# Patient Record
Sex: MALE | Race: WHITE | NOT HISPANIC OR LATINO | Employment: FULL TIME | URBAN - METROPOLITAN AREA
[De-identification: names, ages, dates, MRNs, and addresses within clinical notes are randomized per-mention and may not be internally consistent; named-entity substitution may affect disease eponyms.]

---

## 2017-04-18 ENCOUNTER — OFFICE VISIT - HEALTHEAST (OUTPATIENT)
Dept: FAMILY MEDICINE | Facility: CLINIC | Age: 22
End: 2017-04-18

## 2017-04-18 DIAGNOSIS — A53.9 SYPHILIS: ICD-10-CM

## 2017-04-18 RX ORDER — IBUPROFEN 600 MG/1
TABLET, FILM COATED ORAL
Refills: 0 | Status: SHIPPED | COMMUNITY
Start: 2017-04-01

## 2017-04-19 LAB — SYPHILIS RPR SCREEN - HISTORICAL: REACTIVE

## 2017-04-21 ENCOUNTER — AMBULATORY - HEALTHEAST (OUTPATIENT)
Dept: NURSING | Facility: CLINIC | Age: 22
End: 2017-04-21

## 2017-04-21 LAB — SPECIMEN STATUS: NORMAL

## 2017-04-24 ENCOUNTER — COMMUNICATION - HEALTHEAST (OUTPATIENT)
Dept: FAMILY MEDICINE | Facility: CLINIC | Age: 22
End: 2017-04-24

## 2017-05-05 ENCOUNTER — AMBULATORY - HEALTHEAST (OUTPATIENT)
Dept: NURSING | Facility: CLINIC | Age: 22
End: 2017-05-05

## 2017-05-12 ENCOUNTER — COMMUNICATION - HEALTHEAST (OUTPATIENT)
Dept: FAMILY MEDICINE | Facility: CLINIC | Age: 22
End: 2017-05-12

## 2017-05-12 ENCOUNTER — AMBULATORY - HEALTHEAST (OUTPATIENT)
Dept: NURSING | Facility: CLINIC | Age: 22
End: 2017-05-12

## 2018-02-21 ENCOUNTER — OFFICE VISIT - HEALTHEAST (OUTPATIENT)
Dept: FAMILY MEDICINE | Facility: CLINIC | Age: 23
End: 2018-02-21

## 2018-02-21 DIAGNOSIS — J32.9 SINUSITIS: ICD-10-CM

## 2018-02-21 DIAGNOSIS — R21 RASH AND NONSPECIFIC SKIN ERUPTION: ICD-10-CM

## 2018-02-21 RX ORDER — OXYMETAZOLINE HYDROCHLORIDE 0.05 G/100ML
2 SPRAY NASAL 2 TIMES DAILY
Qty: 15 ML | Refills: 0 | Status: SHIPPED | OUTPATIENT
Start: 2018-02-21

## 2018-02-21 RX ORDER — IPRATROPIUM BROMIDE 21 UG/1
2 SPRAY, METERED NASAL 2 TIMES DAILY
Qty: 30 ML | Refills: 2 | Status: SHIPPED | OUTPATIENT
Start: 2018-02-21

## 2018-02-22 LAB — SYPHILIS RPR SCREEN - HISTORICAL: REACTIVE

## 2018-02-26 LAB — SPECIMEN STATUS: NORMAL

## 2018-02-27 ENCOUNTER — COMMUNICATION - HEALTHEAST (OUTPATIENT)
Dept: FAMILY MEDICINE | Facility: CLINIC | Age: 23
End: 2018-02-27

## 2018-03-02 ENCOUNTER — OFFICE VISIT - HEALTHEAST (OUTPATIENT)
Dept: FAMILY MEDICINE | Facility: CLINIC | Age: 23
End: 2018-03-02

## 2018-03-02 DIAGNOSIS — R21 RASH AND NONSPECIFIC SKIN ERUPTION: ICD-10-CM

## 2018-03-02 DIAGNOSIS — A53.9 SYPHILIS: ICD-10-CM

## 2018-03-23 ENCOUNTER — COMMUNICATION - HEALTHEAST (OUTPATIENT)
Dept: ADMINISTRATIVE | Facility: CLINIC | Age: 23
End: 2018-03-23

## 2018-04-09 ENCOUNTER — RECORDS - HEALTHEAST (OUTPATIENT)
Dept: ADMINISTRATIVE | Facility: OTHER | Age: 23
End: 2018-04-09

## 2021-05-30 VITALS — BODY MASS INDEX: 26.06 KG/M2 | WEIGHT: 166.4 LBS

## 2021-06-01 VITALS — BODY MASS INDEX: 27.82 KG/M2 | WEIGHT: 177.6 LBS

## 2021-06-01 VITALS — WEIGHT: 181.8 LBS | BODY MASS INDEX: 28.47 KG/M2

## 2021-06-10 NOTE — PROGRESS NOTES
ASSESSMENT:  1. Syphilis  - RPR  - penicillin G benzathine injection 2.4 Million Units (BICILLIN-LA); Inject 4 mL (2.4 Million Units total) into the shoulder, thigh, or buttocks once a week.    PLAN:  There are no Patient Instructions on file for this visit.    Orders Placed This Encounter   Procedures     RPR     Medications Discontinued During This Encounter   Medication Reason     penicillin G benzathine injection 2.4 Million Units (BICILLIN-LA)        No Follow-up on file.     Ordered penicillin injections for syphilis. Patient will have the first injection on Friday, April 21st. Will get lab as well to confirm.Coinseled to make sure to have a full treatment this time around to try to eradicate this.     CHIEF COMPLAINT:  Chief Complaint   Patient presents with     red spots     red spots on genitals and on chest x 1 week       HISTORY OF PRESENT ILLNESS:  Gal is a 21 y.o. male presenting to the clinic today for evaluation of rash.He has a history of Syphilis in the past not fully treated.He has had red spots on his genital area and chest. He first noticed the red spots in his genital area yesterday. He noticed the red spots on his chest before the spots on his genital area, but the spots on his chest were not concerning to him initially because he thought that it was acne. The red spots are not painful or itchy. He does not have any rashes on his palms. He does not have any rashes on his mouth currently. Of note, he has had red spots on his mouth, hands, and feet in the past from syphilis, but he believes the red spots he has now are different. When he had syphilis, he was treated with 2 penicillin injections, but he never had the third injection because the appointment was never set up. He is not sexually active.     REVIEW OF SYSTEMS:   He notes that he is tired and has had lack of sleep because he has strange work hours. All other systems are negative.     PFSH:  Social: He will be on a camping trip from  April 23-29.     Reviewed, as below.     TOBACCO USE:  History   Smoking Status     Never Smoker   Smokeless Tobacco     Never Used       VITALS:  Vitals:    04/18/17 0824   BP: 112/68   Pulse: 68   Weight: 166 lb 6.4 oz (75.5 kg)     Wt Readings from Last 3 Encounters:   04/18/17 166 lb 6.4 oz (75.5 kg)   04/19/16 168 lb 3.2 oz (76.3 kg) (68 %, Z= 0.48)*     * Growth percentiles are based on CDC 2-20 Years data.     Body mass index is 26.06 kg/(m^2).    PHYSICAL EXAM:  General Appearance: Alert, cooperative, no distress, appears stated age  HEENT: Pupils equal, symmetric  Lungs: Respirations unlabored  Skin: Red blanchable rash, multiple noted on the chest and on the phallus.   Neurologic:  Alert and oriented times 3. Normal reflexes. Cranial nerves II-XII intact.   Psychiatric: Normal mood and affect.      ADDITIONAL HISTORY SUMMARIZED (2): Reviewed 4/19/2016 note from Dr. Smith regarding mouth lesion.   DECISION TO OBTAIN EXTRA INFORMATION (1): None.   RADIOLOGY TESTS (1): None.  LABS (1): Ordered lab. Reviewed 4/19/2016 syphilis screen.   MEDICINE TESTS (1): None.  INDEPENDENT REVIEW (2 each): None.       The visit lasted a total of 21 minutes face to face with the patient. Over 50% of the time was spent counseling and educating the patient about rash and syphilis.     IDonavon, am scribing for and in the presence of, Dr. Hamm.    I, Dr. Hamm, personally performed the services described in this documentation, as scribed by Donavon Agarwal in my presence, and it is both accurate and complete.    MEDICATIONS:  Current Outpatient Prescriptions   Medication Sig Dispense Refill     ibuprofen (ADVIL,MOTRIN) 600 MG tablet TAKE 1 TABLET BY MOUTH EVERY 6 HOURS FOR 3 DAYS, THEN EVERY 6 HOURS AS NEEDED FOR PAIN.  0     multivitamin therapeutic (THERAGRAN) tablet Take 1 tablet by mouth daily.       Current Facility-Administered Medications   Medication Dose Route Frequency Provider Last Rate Last Dose     [START  ON 4/21/2017] penicillin G benzathine injection 2.4 Million Units (BICILLIN-LA)  2.4 Million Units Intramuscular Weekly Hawk Hamm MD           Total data points: 3

## 2021-06-16 NOTE — PROGRESS NOTES
ASSESSMENT:  1. Rash and nonspecific skin eruption  - Ambulatory referral to Dermatology  - doxycycline (VIBRA-TABS) 100 MG tablet; Take 1 tablet (100 mg total) by mouth 2 (two) times a day for 14 days.  Dispense: 28 tablet; Refill: 0    2. Syphilis  - Syphilis Screen, [place]; Future      PLAN:  He feels that the rash is very mildly improved.  But I went through with him the previous lab results for the TPPA titer.  Noted that he April 2016 the titer was 1 in 32, in April 22, 2017 and the data has reduced to 1 in 4 and he was treated also.  And currently on 23 February 2018 he had a titre of 1 in 2.  Based on that I do not think that it is necessary to have been treated but because of his concern about it I did give him another prescription for doxycycline for 21 days.  And he will come back in for a recheck.  I also give him a referral to see the dermatologist for the nonspecific rash which I do not think is syphilitic.    Orders Placed This Encounter   Procedures     Syphilis Screen, [place]     Standing Status:   Future     Standing Expiration Date:   3/2/2019     Ambulatory referral to Dermatology     Referral Priority:   Routine     Referral Type:   Consultation     Referral Reason:   Evaluation and Treatment     [facility]     Requested Specialty:   Dermatology     Number of Visits Requested:   1     There are no discontinued medications.    No Follow-up on file.      CHIEF COMPLAINT:  Chief Complaint   Patient presents with     Follow-up     f/u results and rash       HISTORY OF PRESENT ILLNESS:  Gal is a 22 y.o. male presenting to the clinic today for follow-up of results having continuing rash.  He had a history of syphilis first treated in 2016, and treated again in April 2017.  Following the for treatment in 2017 he had come into have a recheck and was also having some rash.  He had another test for syphilis which came back showing a titre of 1 in 2.  He was reassured  with regards to the rash and I advised him to use moisturizing cream on his rise see if that will be helpful.  Noted that he has been doing that it does appear to improve but he still has it.  Noted rash not giving him any itching or pain.  Rash appears to be worse on his chest, and he has some other rash noted on his lower arms.  Rash appears to be more pronounced after showering especially that of the chest.  He noted no other major concerns with it.    REVIEW OF SYSTEMS:   He denied having any other concerns at this point but does have a little bit of runny nose.  Has no fevers or chills.  Does not have any urinary symptoms.  His appetite is good.  No constipation and no diarrhea.  All other systems are negative.    PFSH:  Reviewed, as below.    History   Smoking Status     Never Smoker   Smokeless Tobacco     Never Used       No family history on file.    Social History     Social History     Marital status: Single     Spouse name: N/A     Number of children: N/A     Years of education: N/A     Occupational History     Not on file.     Social History Main Topics     Smoking status: Never Smoker     Smokeless tobacco: Never Used     Alcohol use Not on file     Drug use: Not on file     Sexual activity: Not on file     Other Topics Concern     Not on file     Social History Narrative       No past surgical history on file.    Allergies   Allergen Reactions     Sulfa (Sulfonamide Antibiotics) Rash       Active Ambulatory Problems     Diagnosis Date Noted     No Active Ambulatory Problems     Resolved Ambulatory Problems     Diagnosis Date Noted     No Resolved Ambulatory Problems     No Additional Past Medical History       Current Outpatient Prescriptions   Medication Sig Dispense Refill     ibuprofen (ADVIL,MOTRIN) 600 MG tablet TAKE 1 TABLET BY MOUTH EVERY 6 HOURS FOR 3 DAYS, THEN EVERY 6 HOURS AS NEEDED FOR PAIN.  0     ipratropium (ATROVENT) 0.03 % nasal spray 2 sprays into each nostril 2 (two) times a day. 30  mL 2     oxymetazoline (AFRIN) 0.05 % nasal spray 2 sprays into each nostril 2 (two) times a day. 15 mL 0     doxycycline (VIBRA-TABS) 100 MG tablet Take 1 tablet (100 mg total) by mouth 2 (two) times a day for 14 days. 28 tablet 0     multivitamin therapeutic (THERAGRAN) tablet Take 1 tablet by mouth daily.       No current facility-administered medications for this visit.        VITALS:  Vitals:    03/02/18 1141   BP: 132/70   Patient Site: Left Arm   Patient Position: Sitting   Cuff Size: Adult Regular   Pulse: 60   Weight: 181 lb 12.8 oz (82.5 kg)     Wt Readings from Last 3 Encounters:   03/02/18 181 lb 12.8 oz (82.5 kg)   02/21/18 177 lb 9.6 oz (80.6 kg)   04/18/17 166 lb 6.4 oz (75.5 kg)     Body mass index is 28.47 kg/(m^2).    PHYSICAL EXAM:  General Appearance: Alert, cooperative, no distress, appears stated age, afebrile to touch and not pale.  HEENT: Pupils are equal and reactive, extraocular motions is normal. Neck is supple no notable thyromegaly.  External ears are normal.  Lungs:Respirations unlabored  Heart: Normal peripheral pulsations is noted.  Abdomen: Soft  Musculoskeletal: Normal range of motion.  Normal gait.  Skin: He does have slightly papular brownish rash on the distal aspect of his forearm bilaterally, there is little red spots on his chest which on his phone picture appeared reddish.  Neurologic:  Alert and oriented times 3.   Psychiatric: Normal mood and affect.    MEDICATIONS:  Current Outpatient Prescriptions   Medication Sig Dispense Refill     ibuprofen (ADVIL,MOTRIN) 600 MG tablet TAKE 1 TABLET BY MOUTH EVERY 6 HOURS FOR 3 DAYS, THEN EVERY 6 HOURS AS NEEDED FOR PAIN.  0     ipratropium (ATROVENT) 0.03 % nasal spray 2 sprays into each nostril 2 (two) times a day. 30 mL 2     oxymetazoline (AFRIN) 0.05 % nasal spray 2 sprays into each nostril 2 (two) times a day. 15 mL 0     doxycycline (VIBRA-TABS) 100 MG tablet Take 1 tablet (100 mg total) by mouth 2 (two) times a day for 14 days.  28 tablet 0     multivitamin therapeutic (THERAGRAN) tablet Take 1 tablet by mouth daily.       No current facility-administered medications for this visit.

## 2021-06-16 NOTE — PROGRESS NOTES
Assessment/Plan:        Diagnoses and all orders for this visit:    Rash and nonspecific skin eruption  -     Syphilis Screen, [place]  -     [facility] L[facility]    Sinusitis  -     ipratropium (ATROVENT) 0.03 % nasal spray; 2 sprays into each nostril 2 (two) times a day.  Dispense: 30 mL; Refill: 2  -     oxymetazoline (AFRIN) 0.05 % nasal spray; 2 sprays into each nostril 2 (two) times a day.  Dispense: 15 mL; Refill: 0    Unfortunately the rash is nonspecific at this point though he has had a prior rash of syphilis before but he was treated.  But I did encourage him to get tested again for syphilis and we will consider treating that depending on the results.  In the meantime I did encourage him to use moisturizing lotion for his body to see if that could be as a result of some dryness.  I did also encourage him to start using decongestants for his nose congestion.  I gave him a prescription for Atrovent nasal spray as well as oxymetazoline that he will get over-the-counter.  If he continues to have a lot of runny nose or congestion by this time next week ago-call for possible antibiotic use.    Subjective:    Patient ID: Gal Marcelino is a 22 y.o. male.    Rash   This is a new (He has a prior history of syphilitic rash, he was treated for syphilis with penicillin shots.  Noted recent rash on his chest only which has been going off and on according to him for months now.  Rash is not painful, it is not itchy. ) problem. The current episode started more than 1 month ago. The problem has been waxing and waning since onset. The affected locations include the chest (Mainly anterior chest.). The rash is characterized by redness. He was exposed to nothing. Associated symptoms include congestion, coughing and rhinorrhea. Pertinent negatives include no anorexia, diarrhea, eye pain, facial edema, fatigue, fever, nail changes, shortness of breath or sore throat. (His history of congestion is associated with the rash.  The  congestion started yesterday.) Past treatments include nothing.   As noted he does have a recent onset of sinus congestion that started yesterday.  Noted congestion to his nose and mild cough.  He has no fever but has been having some headaches which predates the sinus congestion.  He denies any nausea vomiting.  Does have some neck tension.    The following portions of the patient's history were reviewed and updated as appropriate: allergies, current medications, past family history, past medical history, past social history, past surgical history and problem list.    Review of Systems   Constitutional: Negative for appetite change, chills, fatigue and fever.   HENT: Positive for congestion, rhinorrhea, sinus pain and sinus pressure. Negative for postnasal drip and sore throat.    Eyes: Negative for pain.   Respiratory: Positive for cough. Negative for chest tightness and shortness of breath.    Cardiovascular: Negative for chest pain.   Gastrointestinal: Negative for anorexia and diarrhea.   Skin: Positive for rash. Negative for color change, nail changes and wound.   Neurological: Positive for headaches. Negative for dizziness and light-headedness.     Vitals:    02/21/18 1147   BP: 118/68   Patient Site: Left Arm   Patient Position: Sitting   Cuff Size: Adult Regular   Pulse: 60   Weight: 177 lb 9.6 oz (80.6 kg)             Objective:    Physical Exam   Constitutional: He appears well-developed and well-nourished. No distress.   He does appear worried looking.   HENT:   Right Ear: Tympanic membrane normal.   Left Ear: Tympanic membrane normal.   Nose: Rhinorrhea present. Right sinus exhibits maxillary sinus tenderness. Right sinus exhibits no frontal sinus tenderness. Left sinus exhibits maxillary sinus tenderness. Left sinus exhibits no frontal sinus tenderness.   Mouth/Throat: Posterior oropharyngeal edema present. No oropharyngeal exudate or posterior oropharyngeal erythema.   Neck: Normal range of motion.  Neck supple.   Cardiovascular: Normal rate and regular rhythm.    Pulmonary/Chest: Effort normal and breath sounds normal.   Skin: Rash noted.   He does have mildly diffuse but sparse reddish rash noted on the upper chest.  There is some with slightly raised middle.  There is no umbilication.  He does not have any rash on the palms of his hands.  He does have a slight lesion on the corner of his mouth right side appearing to be drying up.

## 2022-06-21 ENCOUNTER — TRANSFERRED RECORDS (OUTPATIENT)
Dept: HEALTH INFORMATION MANAGEMENT | Facility: CLINIC | Age: 27
End: 2022-06-21

## 2024-08-23 ENCOUNTER — HOSPITAL ENCOUNTER (EMERGENCY)
Facility: CLINIC | Age: 29
Discharge: HOME | End: 2024-08-23
Attending: EMERGENCY MEDICINE | Admitting: EMERGENCY MEDICINE

## 2024-08-23 VITALS
DIASTOLIC BLOOD PRESSURE: 91 MMHG | RESPIRATION RATE: 20 BRPM | TEMPERATURE: 98.1 F | HEART RATE: 66 BPM | SYSTOLIC BLOOD PRESSURE: 142 MMHG | WEIGHT: 250 LBS | BODY MASS INDEX: 37.89 KG/M2 | HEIGHT: 68 IN | OXYGEN SATURATION: 98 %

## 2024-08-23 DIAGNOSIS — Z77.098 CHEMICAL EXPOSURE: ICD-10-CM

## 2024-08-23 PROCEDURE — 99284 EMERGENCY DEPT VISIT MOD MDM: CPT | Mod: 25

## 2024-08-23 PROCEDURE — 96375 TX/PRO/DX INJ NEW DRUG ADDON: CPT

## 2024-08-23 PROCEDURE — 258N000003 HC RX IP 258 OP 636: Performed by: EMERGENCY MEDICINE

## 2024-08-23 PROCEDURE — 250N000011 HC RX IP 250 OP 636: Performed by: EMERGENCY MEDICINE

## 2024-08-23 PROCEDURE — 96361 HYDRATE IV INFUSION ADD-ON: CPT

## 2024-08-23 PROCEDURE — 96374 THER/PROPH/DIAG INJ IV PUSH: CPT

## 2024-08-23 RX ORDER — DEXAMETHASONE SODIUM PHOSPHATE 10 MG/ML
10 INJECTION, SOLUTION INTRAMUSCULAR; INTRAVENOUS ONCE
Status: COMPLETED | OUTPATIENT
Start: 2024-08-23 | End: 2024-08-23

## 2024-08-23 RX ORDER — DIPHENHYDRAMINE HYDROCHLORIDE 50 MG/ML
25 INJECTION INTRAMUSCULAR; INTRAVENOUS ONCE
Status: COMPLETED | OUTPATIENT
Start: 2024-08-23 | End: 2024-08-23

## 2024-08-23 RX ADMIN — FAMOTIDINE 20 MG: 10 INJECTION, SOLUTION INTRAVENOUS at 21:09

## 2024-08-23 RX ADMIN — DIPHENHYDRAMINE HYDROCHLORIDE 25 MG: 50 INJECTION INTRAMUSCULAR; INTRAVENOUS at 21:10

## 2024-08-23 RX ADMIN — SODIUM CHLORIDE 1000 ML: 9 INJECTION, SOLUTION INTRAVENOUS at 21:08

## 2024-08-23 RX ADMIN — DEXAMETHASONE SODIUM PHOSPHATE 10 MG: 10 INJECTION INTRAMUSCULAR; INTRAVENOUS at 21:10

## 2024-08-23 ASSESSMENT — COLUMBIA-SUICIDE SEVERITY RATING SCALE - C-SSRS
1. IN THE PAST MONTH, HAVE YOU WISHED YOU WERE DEAD OR WISHED YOU COULD GO TO SLEEP AND NOT WAKE UP?: NO
2. HAVE YOU ACTUALLY HAD ANY THOUGHTS OF KILLING YOURSELF IN THE PAST MONTH?: NO
6. HAVE YOU EVER DONE ANYTHING, STARTED TO DO ANYTHING, OR PREPARED TO DO ANYTHING TO END YOUR LIFE?: NO

## 2024-08-23 ASSESSMENT — ENCOUNTER SYMPTOMS
LIGHT-HEADEDNESS: 1
HEADACHES: 1
ROS SKIN COMMENTS: + ITCHING
TREMORS: 1
DIZZINESS: 1
COLOR CHANGE: 1

## 2024-08-23 ASSESSMENT — ACTIVITIES OF DAILY LIVING (ADL)
ADLS_ACUITY_SCORE: 35
ADLS_ACUITY_SCORE: 33
ADLS_ACUITY_SCORE: 35

## 2024-08-24 NOTE — DISCHARGE INSTRUCTIONS
It is unclear what you were exposed to that precipitated this reaction.  Could be allergic mediated or chemical irritation.  I would expect continued improvement the further you get away from this exposure if you have any return or escalation to your symptoms please return for repeat evaluation.

## 2024-08-24 NOTE — ED PROVIDER NOTES
EMERGENCY DEPARTMENT ENCOUNTER      NAME: Gal Marcelino  AGE: 29 year old male  YOB: 1995  MRN: [id number]  EVALUATION DATE & TIME: 8/23/2024  8:47 PM    PCP: No primary care provider on file.    ED PROVIDER: Marco Box MD    Chief Complaint   Patient presents with    Chemical Exposure    Allergic Reaction     FINAL IMPRESSION:  1. Chemical exposure      ED COURSE & MEDICAL DECISION MAKING:    Pertinent Labs & Imaging studies reviewed. (See chart for details)  29 year old male presents to the Emergency Department for evaluation of possible chemical exposure or allergic reaction occurring at work.  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  He shook the clothing and shortly thereafter started to feel tongue swelling fatigue and lightheadedness.  No rash.  No chest pain or shortness of breath.  This prompted emergency department assessment.  I examined the patient on the triage room secondary to boarding capacity issues in the emergency department he was well-appearing.  I did not appreciate any intraoral swelling he did feel like the tongue was swollen subjectively.  There was no cardiopulmonary abnormalities no hives appreciated.  His pupils were equal and reactive.  No clear toxidrome evident on clinical examination.  He does have a history of environmental allergies.  I recommended the patient that we administer a dose of steroids and Benadryl I do not see indications for epinephrine at this time.  Will plan to monitor in the emergency department and reassess his symptoms post medication management and observation.    8:45 PM I met with the patient for an initial encounter and evaluation.    9:50 PM  Repeat examination patient is feeling improved after medications administered.    10:55 PM  Patient is feeling markedly improved and requesting discharge which I think is appropriate at this point given his clinical improvement.  Discussed treatment  plan and return precautions prior to discharge.     Medical Decision Making  Obtained supplemental history:Supplemental history obtained?: Documented in chart  Reviewed external records: External records reviewed?: No  Care impacted by chronic illness:N/A  Care significantly affected by social determinants of health:N/A  Did you consider but not order tests?: Work up considered but not performed and documented in chart, if applicable  Did you interpret images independently?: Independent interpretation of ECG and images noted in documentation, when applicable.  Consultation discussion with other provider:Did you involve another provider (consultant, , pharmacy, etc.)?: No  Discharge. No recommendations on prescription strength medication(s). See documentation for any additional details.    At the conclusion of the encounter I discussed the results of all of the tests and the disposition. The questions were answered. The patient or family acknowledged understanding and was agreeable with the care plan.       MEDICATIONS GIVEN IN THE EMERGENCY:  Medications   sodium chloride 0.9% BOLUS 1,000 mL (0 mLs Intravenous Stopped 8/23/24 2233)   dexAMETHasone PF (DECADRON) injection 10 mg (10 mg Intravenous $Given 8/23/24 2110)   diphenhydrAMINE (BENADRYL) injection 25 mg (25 mg Intravenous $Given 8/23/24 2110)   famotidine (PEPCID) injection 20 mg (20 mg Intravenous $Given 8/23/24 2109)       NEW PRESCRIPTIONS STARTED AT TODAY'S ER VISIT  New Prescriptions    No medications on file     Modified Medications    No medications on file       =================================================================    HPI    Patient information was obtained from: [employer]    Use of : N/A       Gal Marcelino is a 29 year old male with a pertinent history of seborrheic dermatitis who presents to this ED by private car for evaluation of a chemical exposure which occurred tonight, at work. He was attempting to fix a clothing  "item and \"shook it\" but noticed fumes were exposed. However, there weren't any visible or odorous chemicals. Patient started to develop redness on his face and chest, along with a sensation of wanting to \"bite\" his tongue. He took a Zyrtec without relief, PTA. He currently endorses lightheadedness, dizziness, tremors, swollen tongue, itching, and a worsening headache that is worse than his usual headaches. Patient currently rates his headache a +5/10. He denies rashes.    Patient reports he is allergic to sulfur medications, plus cat and dog dander. No other medical concerns are expressed at this time.     REVIEW OF SYSTEMS   Review of Systems   HENT:          + swollen tongue   Skin:  Positive for color change (redness on face and chest).        + itching   Neurological:  Positive for dizziness, tremors, light-headedness and headaches (worse than usual headaches).   All other systems reviewed and are negative.       PHYSICAL EXAM    BP (!) 142/91   Pulse 60   Temp 98.1  F (36.7  C) (Temporal)   Resp 20   Ht 1.727 m (5' 8\")   Wt 113.4 kg (250 lb)   SpO2 98%   BMI 38.01 kg/m    PHYSICAL EXAM    Constitutional: Well developed, Well nourished, NAD  HENT: Normocephalic, Atraumatic, Bilateral external ears normal, Oropharynx normal, mucous membranes moist, Nose normal. Neck-  Normal range of motion, No tenderness, Supple, No stridor.   Eyes: PERRL, EOMI, Conjunctiva normal, No discharge.   Respiratory: Normal breath sounds, No respiratory distress, No wheezing, Speaks full sentences easily. No cough.   Cardiovascular: Normal heart rate, Regular rhythm, No murmurs Chest wall nontender.    GI:  Soft, No tenderness, No masses, No flank tenderness. No rebound or guarding.  : No cva tenderness    Musculoskeletal: 2+ DP pulses. No edema. No cyanosis. Good range of motion in all major joints. No tenderness to palpation. No tenderness of the CTLS spine.   Integument: Warm, Dry, No erythema, No rash. No petechiae. "   Neurologic: Alert & oriented x 3, Normal motor function, Normal sensory function, No focal deficits noted.   Psychiatric: Affect normal, Judgment normal, Mood normal. Cooperative.            I, Amanda Ying, am serving as a scribe to document services personally performed by Marco Box MD based on my observation and the provider's statements to me. I, Marco Box MD, attest that Arjunalhaji Ying is acting in a scribe capacity, has observed my performance of the services and has documented them in accordance with my direction.    Marco Box MD   [facility] EMERGENCY ROOM  [address] [id number] [phone]       Marco Box MD  08/23/24 0060

## 2024-08-24 NOTE — ED TRIAGE NOTES
Patient arrives to the ER with c/o a chemical exposure at work. At work, he was folding an item of clothing around 1850 and at about 1855 he started to experience a red face/chest area along with a numb tongue/ feeling of the tongue being larger than normal. States he also feels like he is taking more shallow breaths. Took a Zyrtec PTA.     Triage Assessment (Adult)       Row Name 08/23/24 2040          Triage Assessment    Airway WDL WDL        Respiratory WDL    Respiratory WDL WDL        Skin Circulation/Temperature WDL    Skin Circulation/Temperature WDL X  c/o turning red after being exposed to a substance at work, eyes red        Peripheral/Neurovascular WDL    Peripheral Neurovascular WDL WDL        Cognitive/Neuro/Behavioral WDL    Cognitive/Neuro/Behavioral WDL WDL